# Patient Record
Sex: FEMALE | Race: WHITE | NOT HISPANIC OR LATINO | Employment: UNEMPLOYED | URBAN - METROPOLITAN AREA
[De-identification: names, ages, dates, MRNs, and addresses within clinical notes are randomized per-mention and may not be internally consistent; named-entity substitution may affect disease eponyms.]

---

## 2017-11-22 ENCOUNTER — HOSPITAL ENCOUNTER (EMERGENCY)
Facility: HOSPITAL | Age: 42
Discharge: HOME | End: 2017-11-22
Attending: EMERGENCY MEDICINE
Payer: MEDICAID

## 2017-11-22 VITALS
BODY MASS INDEX: 50.02 KG/M2 | DIASTOLIC BLOOD PRESSURE: 78 MMHG | WEIGHT: 293 LBS | HEART RATE: 84 BPM | RESPIRATION RATE: 20 BRPM | SYSTOLIC BLOOD PRESSURE: 131 MMHG | TEMPERATURE: 98 F | OXYGEN SATURATION: 98 % | HEIGHT: 64 IN

## 2017-11-22 DIAGNOSIS — T81.30XA WOUND DEHISCENCE: Primary | ICD-10-CM

## 2017-11-22 LAB
ALBUMIN SERPL BCP-MCNC: 2.6 G/DL
ALP SERPL-CCNC: 116 U/L
ALT SERPL W/O P-5'-P-CCNC: 40 U/L
ANION GAP SERPL CALC-SCNC: 11 MMOL/L
AST SERPL-CCNC: 33 U/L
BASOPHILS # BLD AUTO: 0.01 K/UL
BASOPHILS NFR BLD: 0.1 %
BILIRUB SERPL-MCNC: 0.5 MG/DL
BUN SERPL-MCNC: 7 MG/DL
CALCIUM SERPL-MCNC: 8.8 MG/DL
CHLORIDE SERPL-SCNC: 105 MMOL/L
CO2 SERPL-SCNC: 26 MMOL/L
CREAT SERPL-MCNC: 0.8 MG/DL
DIFFERENTIAL METHOD: ABNORMAL
EOSINOPHIL # BLD AUTO: 0.1 K/UL
EOSINOPHIL NFR BLD: 1.7 %
ERYTHROCYTE [DISTWIDTH] IN BLOOD BY AUTOMATED COUNT: 15.4 %
EST. GFR  (AFRICAN AMERICAN): >60 ML/MIN/1.73 M^2
EST. GFR  (NON AFRICAN AMERICAN): >60 ML/MIN/1.73 M^2
GLUCOSE SERPL-MCNC: 118 MG/DL
HCT VFR BLD AUTO: 30.4 %
HGB BLD-MCNC: 9.5 G/DL
IMM GRANULOCYTES # BLD AUTO: 0.04 K/UL
IMM GRANULOCYTES NFR BLD AUTO: 0.5 %
LACTATE SERPL-SCNC: 0.9 MMOL/L
LYMPHOCYTES # BLD AUTO: 1.4 K/UL
LYMPHOCYTES NFR BLD: 16.2 %
MCH RBC QN AUTO: 28.2 PG
MCHC RBC AUTO-ENTMCNC: 31.3 G/DL
MCV RBC AUTO: 90 FL
MONOCYTES # BLD AUTO: 0.4 K/UL
MONOCYTES NFR BLD: 4.9 %
NEUTROPHILS # BLD AUTO: 6.5 K/UL
NEUTROPHILS NFR BLD: 76.6 %
NRBC BLD-RTO: 0 /100 WBC
PLATELET # BLD AUTO: 253 K/UL
PMV BLD AUTO: 10.6 FL
POCT GLUCOSE: 126 MG/DL (ref 70–110)
POTASSIUM SERPL-SCNC: 3.3 MMOL/L
PROT SERPL-MCNC: 6 G/DL
RBC # BLD AUTO: 3.37 M/UL
SODIUM SERPL-SCNC: 142 MMOL/L
WBC # BLD AUTO: 8.44 K/UL

## 2017-11-22 PROCEDURE — 99282 EMERGENCY DEPT VISIT SF MDM: CPT | Mod: ,,, | Performed by: EMERGENCY MEDICINE

## 2017-11-22 PROCEDURE — 80053 COMPREHEN METABOLIC PANEL: CPT

## 2017-11-22 PROCEDURE — 99284 EMERGENCY DEPT VISIT MOD MDM: CPT | Mod: 25

## 2017-11-22 PROCEDURE — 85025 COMPLETE CBC W/AUTO DIFF WBC: CPT

## 2017-11-22 PROCEDURE — 83605 ASSAY OF LACTIC ACID: CPT

## 2017-11-22 PROCEDURE — 82962 GLUCOSE BLOOD TEST: CPT

## 2017-11-22 NOTE — ED NOTES
Pt to er c/o wound infection to back of L thigh that spreads all of the way to the front to her L lower ab and groin--pt had surgery last month for it--sutures have dehisced and wound is draining yellow discharge--edges are not approximated and wound is gaping--redness surrounds wound--recent dx of DM last month

## 2017-11-22 NOTE — ED PROVIDER NOTES
"Encounter Date: 11/22/2017    SCRIBE #1 NOTE: I, Rita Holden, am scribing for, and in the presence of, Dr. Baptiste.       History     Chief Complaint   Patient presents with    Wound Infection     Pt was diagnosed with necrotizing facsciitis at [facility]. Pt states "I had a boil that popped up 2 days ago and Now it's circumferential around my leg and bleeding."      Time seen by provider: 12:06 PM    This is a 42 y.o. female with history of DM, DVT, necrotizing fasciitis who presents to the ED via EMS with complaint of wound problems. The patient is s/p five surgeries for necrotizing fasciitis involving the left groin performed at [facility] by Dr. Johnathan Barlow between September 28 and October 31. She describes sutures starting in her left groin that wrap around to her left buttock. She states that she called an ambulance this morning for drainage from the incision site and was taken to [facility] ED, but left prior to being seen. After leaving the ED, she experienced reported bleeding and increased drainage from the incision site while on the streetcar and the  called an ambulance. She has recently finished a cephalosporin antibiotic. She was recently treated for a DVT and is on Xarelto, but states she did not take it this morning because she does not have access to the medication (last took Xarelto last night). Patient was recently diagnosed with diabetes at the end of September. She was in DKA while admitted to the hospital for her surgeries. She states her blood sugar has been running in the 140s-180s recently. She reports noncompliance with insulin over the last month because she "doesn't know how to use insulin." Patient is currently staying at a hotel in [place]. Known drug allergy to penicillin.       The history is provided by the patient.     Review of patient's allergies indicates:  Allergies not on file  Past Medical History:   Diagnosis Date    Depression     Diabetes     DKA (diabetic ketoacidoses)     DVT " (deep venous thrombosis)     Hypertension     Staph infection      Past Surgical History:   Procedure Laterality Date    WOUND DEBRIDEMENT       No family history on file.  Social History   Substance Use Topics    Smoking status: Not on file    Smokeless tobacco: Not on file    Alcohol use Not on file     Review of Systems   Constitutional: Negative for fever.   HENT: Negative for sore throat.    Respiratory: Negative for shortness of breath.    Cardiovascular: Negative for chest pain.   Gastrointestinal: Negative for vomiting.   Genitourinary: Negative for dysuria.   Musculoskeletal: Negative for joint swelling.   Skin: Positive for wound (incision from left groin to left buttocks).   Neurological: Negative for syncope.   Hematological: Bruises/bleeds easily.       Physical Exam     Initial Vitals [11/22/17 1143]   BP Pulse Resp Temp SpO2   (!) 144/74 75 18 97.6 °F (36.4 °C) 100 %      MAP       97.33         Physical Exam    Nursing note and vitals reviewed.  Constitutional: She appears well-developed and well-nourished. She is not diaphoretic.  Non-toxic appearance. No distress.   HENT:   Head: Normocephalic and atraumatic.   Mouth/Throat: Oropharynx is clear and moist.   Eyes: Conjunctivae and EOM are normal. Pupils are equal, round, and reactive to light.   Neck: Normal range of motion. Neck supple. No JVD present.   Cardiovascular: Normal rate, regular rhythm, normal heart sounds and intact distal pulses.   No murmur heard.  Pulmonary/Chest: Breath sounds normal. No respiratory distress. She has no wheezes. She has no rhonchi. She has no rales.   Abdominal: Soft. Bowel sounds are normal. She exhibits no distension and no mass. There is no tenderness. There is no rebound and no guarding.   Musculoskeletal: Normal range of motion. She exhibits edema (trace pitting edema to BLE). She exhibits no tenderness.   Neurological: She is alert and oriented to person, place, and time. She has normal strength. No  cranial nerve deficit or sensory deficit.   Skin: Skin is warm and dry. No rash noted.   She has a surgical wound to her left groin extending around to her left buttocks. There are sutures holding it in place but the wound is not closed. One of the sutures has ruptured. There is minimal erythema, no bleeding, no pus.   Psychiatric: She has a normal mood and affect. Thought content normal.         ED Course   Procedures  Labs Reviewed   CBC W/ AUTO DIFFERENTIAL - Abnormal; Notable for the following:        Result Value    RBC 3.37 (*)     Hemoglobin 9.5 (*)     Hematocrit 30.4 (*)     MCHC 31.3 (*)     RDW 15.4 (*)     Gran% 76.6 (*)     Lymph% 16.2 (*)     All other components within normal limits   COMPREHENSIVE METABOLIC PANEL - Abnormal; Notable for the following:     Potassium 3.3 (*)     Glucose 118 (*)     Albumin 2.6 (*)     All other components within normal limits   POCT GLUCOSE - Abnormal; Notable for the following:     POCT Glucose 126 (*)     All other components within normal limits   LACTIC ACID, PLASMA   POCT URINE PREGNANCY   POCT GLUCOSE MONITORING CONTINUOUS             Medical Decision Making:   History:   Old Medical Records: I decided to obtain old medical records.  Initial Assessment:   Patient with necrotizing facsciitis 7 weeks ago. It sounds like she is concerned about infection even though she has no fever, no signs of bleeding. Will check basic labs. She is also a diabetic. Sounds like her sugar has been controlled, but will check blood sugar.  Clinical Tests:   Lab Tests: Ordered and Reviewed  ED Management:  1:50 PM  I have reviewed the labs. She has a normal white count and her chemistries are unremarkable, except a mildly low potassium.     1:52 PM  After physical exam and labs, I believe she is stable for discharge. Will discharge home and recommend she follow up with her surgeons at [facility] and continue her medications.   WOund is not infected and held in place as best as can be expected  given the patients situation.          Scribe Attestation:   Scribe #1: I performed the above scribed service and the documentation accurately describes the services I performed. I attest to the accuracy of the note.            ED Course      Clinical Impression:   The encounter diagnosis was Wound dehiscence.  This is a chronic problem  Disposition:   Disposition: Discharged  Condition: Stable  f/u with her surgeons at [facility].                        Edmund Baptiste MD  11/22/17 5430

## 2019-11-12 DIAGNOSIS — Z12.31 ENCOUNTER FOR SCREENING MAMMOGRAM FOR MALIGNANT NEOPLASM OF BREAST: ICD-10-CM

## 2019-11-12 DIAGNOSIS — Z01.411 ENCOUNTER FOR GYNECOLOGICAL EXAMINATION (GENERAL) (ROUTINE) WITH ABNORMAL FINDINGS: Primary | ICD-10-CM
